# Patient Record
Sex: FEMALE | Race: WHITE | HISPANIC OR LATINO
[De-identification: names, ages, dates, MRNs, and addresses within clinical notes are randomized per-mention and may not be internally consistent; named-entity substitution may affect disease eponyms.]

---

## 2021-10-18 ENCOUNTER — NON-APPOINTMENT (OUTPATIENT)
Age: 31
End: 2021-10-18

## 2021-11-23 ENCOUNTER — NON-APPOINTMENT (OUTPATIENT)
Age: 31
End: 2021-11-23

## 2021-11-30 ENCOUNTER — APPOINTMENT (OUTPATIENT)
Dept: BREAST CENTER | Facility: CLINIC | Age: 31
End: 2021-11-30
Payer: COMMERCIAL

## 2021-11-30 ENCOUNTER — NON-APPOINTMENT (OUTPATIENT)
Age: 31
End: 2021-11-30

## 2021-11-30 VITALS
SYSTOLIC BLOOD PRESSURE: 129 MMHG | BODY MASS INDEX: 39.99 KG/M2 | WEIGHT: 240 LBS | TEMPERATURE: 98.6 F | DIASTOLIC BLOOD PRESSURE: 72 MMHG | HEART RATE: 91 BPM | RESPIRATION RATE: 16 BRPM | HEIGHT: 65 IN | OXYGEN SATURATION: 97 %

## 2021-11-30 DIAGNOSIS — N63.0 UNSPECIFIED LUMP IN UNSPECIFIED BREAST: ICD-10-CM

## 2021-11-30 DIAGNOSIS — Z80.3 FAMILY HISTORY OF MALIGNANT NEOPLASM OF BREAST: ICD-10-CM

## 2021-11-30 DIAGNOSIS — Z82.49 FAMILY HISTORY OF ISCHEMIC HEART DISEASE AND OTHER DISEASES OF THE CIRCULATORY SYSTEM: ICD-10-CM

## 2021-11-30 DIAGNOSIS — Z78.9 OTHER SPECIFIED HEALTH STATUS: ICD-10-CM

## 2021-11-30 DIAGNOSIS — Z86.39 PERSONAL HISTORY OF OTHER ENDOCRINE, NUTRITIONAL AND METABOLIC DISEASE: ICD-10-CM

## 2021-11-30 DIAGNOSIS — Z80.0 FAMILY HISTORY OF MALIGNANT NEOPLASM OF DIGESTIVE ORGANS: ICD-10-CM

## 2021-11-30 DIAGNOSIS — Z91.89 OTHER SPECIFIED PERSONAL RISK FACTORS, NOT ELSEWHERE CLASSIFIED: ICD-10-CM

## 2021-11-30 PROCEDURE — 99203 OFFICE O/P NEW LOW 30 MIN: CPT

## 2021-11-30 RX ORDER — AMOXICILLIN 500 MG/1
500 CAPSULE ORAL
Qty: 21 | Refills: 0 | Status: ACTIVE | COMMUNITY
Start: 2021-10-08

## 2021-11-30 RX ORDER — ATORVASTATIN CALCIUM 10 MG/1
10 TABLET, FILM COATED ORAL
Qty: 90 | Refills: 0 | Status: ACTIVE | COMMUNITY
Start: 2021-08-30

## 2021-11-30 RX ORDER — ACETAMINOPHEN 325 MG
TABLET ORAL
Refills: 0 | Status: ACTIVE | COMMUNITY

## 2021-11-30 RX ORDER — ROSUVASTATIN CALCIUM 5 MG/1
5 TABLET, FILM COATED ORAL
Refills: 0 | Status: ACTIVE | COMMUNITY

## 2021-11-30 RX ORDER — ERYTHROMYCIN 5 MG/G
5 OINTMENT OPHTHALMIC
Qty: 3 | Refills: 0 | Status: ACTIVE | COMMUNITY
Start: 2021-11-24

## 2021-11-30 RX ORDER — ASPIRIN 81 MG
81 TABLET, DELAYED RELEASE (ENTERIC COATED) ORAL
Refills: 0 | Status: ACTIVE | COMMUNITY

## 2021-11-30 RX ORDER — UBIDECARENONE/VIT E ACET 100MG-5
CAPSULE ORAL
Refills: 0 | Status: ACTIVE | COMMUNITY

## 2021-11-30 RX ORDER — ERGOCALCIFEROL 1.25 MG/1
1.25 MG CAPSULE, LIQUID FILLED ORAL
Qty: 4 | Refills: 0 | Status: ACTIVE | COMMUNITY
Start: 2021-08-30

## 2021-11-30 RX ORDER — CHLORHEXIDINE GLUCONATE, 0.12% ORAL RINSE 1.2 MG/ML
0.12 SOLUTION DENTAL
Qty: 473 | Refills: 0 | Status: ACTIVE | COMMUNITY
Start: 2021-10-08

## 2021-11-30 RX ORDER — IBUPROFEN 600 MG/1
600 TABLET, FILM COATED ORAL
Qty: 28 | Refills: 0 | Status: ACTIVE | COMMUNITY
Start: 2021-10-08

## 2021-11-30 RX ORDER — ALBUTEROL SULFATE 90 UG/1
108 (90 BASE) INHALANT RESPIRATORY (INHALATION)
Qty: 18 | Refills: 0 | Status: ACTIVE | COMMUNITY
Start: 2021-10-22

## 2021-12-02 NOTE — DATA REVIEWED
[FreeTextEntry1] : 11/17/20: ([facility]) Dx B/L MG & US- Fibroglandular. R RA 0.5cm nodular asymmetry c/w cyst on US. US- Multiple b/l benign appearing cysts. BI-RADS 2. \par \par 11/19/21 ([facility]) b/l dx mmg and US: heterogenously dense. No mammographic or ultrasonographic evidence of malignancy. Multiple benign appearing bilateral nodules and cysts. Specifically there is no suspicious finding in the 5:00 position of the right breast, 12 cm from the nipple in the area of palpable concern reported by the patient. Further management of palpable nodularity should be based on clinical assessment. BI-RADS 2.

## 2021-12-02 NOTE — HISTORY OF PRESENT ILLNESS
[FreeTextEntry1] : 31 year premenopausal female referred by Dr. Cameron presents for evaluation of left breast mass palpated by the patient in May 2020; she states that the lump was superficial, red, swollen, and resolved within two weeks (denies antibiotic use); she is no longer able to palpate the lump.  Denies nipple discharge, nipple/breast skin changes or dimpling. Denies fever and chills. \par \par  BLAIR lifetime risk is 21% her paternal grandmother had breast cancer dx in her 80's; maternal 1st cousin had colon & stomach cancer\par \par Discussed with the patient the implications for their lifetime risk, which is considered to be elevated for the development of breast cancer over the span of their lifetime. It was explained that it is recommended that they undergo high risk screening surveillance to include biannual radiological screening exams with a mammogram and screening MRI. The patient states that she had an MRI at [facility] last year after developing the left breast lump in 2020, reportedly negative results. \par \par Discussed genetic testing, patient will think about it.

## 2021-12-02 NOTE — PAST MEDICAL HISTORY
[Menstruating] : The patient is menstruating [Menarche Age ____] : age at menarche was [unfilled] [Definite ___ (Date)] : the last menstrual period was [unfilled] [Irregular Cycle Intervals] : are  irregular [Total Preg ___] : G[unfilled] [History of Hormone Replacement Treatment] : has no history of hormone replacement treatment [FreeTextEntry5] : none [FreeTextEntry6] : none [FreeTextEntry7] : yes, only for 6 months, 6 years ago [FreeTextEntry8] : none

## 2022-12-09 ENCOUNTER — APPOINTMENT (OUTPATIENT)
Dept: BREAST CENTER | Facility: CLINIC | Age: 32
End: 2022-12-09

## 2023-06-16 ENCOUNTER — APPOINTMENT (OUTPATIENT)
Dept: SURGERY | Facility: CLINIC | Age: 33
End: 2023-06-16
Payer: COMMERCIAL

## 2023-06-16 VITALS
SYSTOLIC BLOOD PRESSURE: 111 MMHG | HEART RATE: 93 BPM | TEMPERATURE: 97.3 F | HEIGHT: 65 IN | BODY MASS INDEX: 38.7 KG/M2 | DIASTOLIC BLOOD PRESSURE: 76 MMHG | OXYGEN SATURATION: 98 % | WEIGHT: 232.25 LBS

## 2023-06-16 DIAGNOSIS — I10 ESSENTIAL (PRIMARY) HYPERTENSION: ICD-10-CM

## 2023-06-16 DIAGNOSIS — J45.909 UNSPECIFIED ASTHMA, UNCOMPLICATED: ICD-10-CM

## 2023-06-16 DIAGNOSIS — K76.0 FATTY (CHANGE OF) LIVER, NOT ELSEWHERE CLASSIFIED: ICD-10-CM

## 2023-06-16 DIAGNOSIS — K80.20 CALCULUS OF GALLBLADDER W/OUT CHOLECYSTITIS W/OUT OBSTRUCTION: ICD-10-CM

## 2023-06-16 PROCEDURE — 99214 OFFICE O/P EST MOD 30 MIN: CPT

## 2023-06-22 PROBLEM — J45.909 ASTHMA: Status: ACTIVE | Noted: 2023-06-16

## 2023-06-22 PROBLEM — K80.20 CHOLELITHIASIS: Status: ACTIVE | Noted: 2023-06-22

## 2023-06-22 PROBLEM — I10 HIGH BLOOD PRESSURE: Status: ACTIVE | Noted: 2023-06-16

## 2023-06-22 PROBLEM — K76.0 STEATOSIS OF LIVER: Status: ACTIVE | Noted: 2021-11-30

## 2023-06-22 NOTE — PHYSICAL EXAM
[Oriented to Person] : oriented to person [Oriented to Place] : oriented to place [Oriented to Time] : oriented to time [Calm] : calm [Abdominal Masses] : No abdominal masses [Abdomen Tenderness] : ~T ~M No abdominal tenderness [Tender] : was nontender [Enlarged] : not enlarged [de-identified] : NAD, comfortable [de-identified] : Normocephalic, atraumatic. No scleral icterus.  [de-identified] : Supple, no JVD or cervical lymphadenopathy.  [de-identified] : No respiratory distress  [de-identified] : +BS soft, nontender, nondistended. No hepatosplenomegaly. \par

## 2023-06-22 NOTE — DATA REVIEWED
[FreeTextEntry1] : Date of Exam: 02-\par  \par EXAM:  ULTRASOUND ABDOMEN COMPLETE\par \par HISTORY:  Female, 33 years-old with a fatty liver\par \par TECHNIQUE:  Using real-time ultrasonography with a high-resolution broadband phased-array curved transducer, multiplanar gray scale images were obtained and supplemented with color Doppler. Static images are provided for review.\par \par COMPARISON:  12/10/2021\par \par FINDINGS:  \par Abdominal Aorta/IVC: No evidence of abdominal aortic aneurysm. Visualized portions of the IVC were patent.\par \par Liver:  Enlarged and increased in echogenicity measuring 20.9 cm in length consistent with diffuse hepatocellular disease, likely hepatic steatosis. The visualized portion of portal and hepatic veins are unremarkable. No intrahepatic biliary duct dilatation.\par \par Gallbladder: There are mobile gallstones layering the dependent portion of the gallbladder. No wall thickening, and no sonographic Childers's Sign.\par \par Common Bile Duct: Normal measuring 0.4 cm diameter at the vikas hepatis.\par \par Pancreas: The visualized portion of the body of the pancreas is within normal limits. The tail is obscured by overlying bowel gas. No pancreatic duct dilatation.\par \par Kidneys: Normal in size, morphology and cortical echotexture. There is no suspicious renal lesion.\par No hydronephrosis, shadowing calculi or perinephric fluid.\par Right Kidney: 10.2 cm in length.\par Left Kidney:   11.2 cm in length.\par \par Spleen: Normal size, contour and echotexture measuring 8.9 cm in length.\par \par IMPRESSION:\par 1. Enlarged echogenic liver consistent with hepatic steatosis.\par 2. Gallstones. No secondary signs of cholecystitis.

## 2023-06-22 NOTE — ASSESSMENT
[FreeTextEntry1] : Case discussed/pt seen with attending, . 34 y/o female with cholelithiasis. She is relatively asymptomatic with her main discomfort being in the LUQ which is atypical symptom of gallstone etiology. Discussed option of surgery however wishes to hold off as she is asymptomatic.  Discussed if symptoms worsening or she develops any new symptoms to contact office. Pt has recently started Wegovy for weight loss. We discussed f/u in 6 months, sooner if needed. All questions answered.

## 2023-06-22 NOTE — HISTORY OF PRESENT ILLNESS
[de-identified] : This is a y/o female/male presenting to the office for evaluation and management of a cholelithiasis. She reports she had an ultrasound to monitor her dx of fatty liver  x 4 months ago and was incidentally diagnosed with gallstones. She reports she occasionally develops discomfort in the left upper quadrant of the abdomen. She reports the discomfort radiates to the right lower quadrant. She reports this discomfort sometimes comes on after eating a fatty meal. She denies any other triggers. She reports occasional nausea and diarrhea not associated with the discomfort. She reports she has avoided fatty foods since being told her diagnosis. She reports she recently started Wegovy to aid with weight loss. She denies any fever,chills.

## 2023-12-08 ENCOUNTER — APPOINTMENT (OUTPATIENT)
Dept: SURGERY | Facility: CLINIC | Age: 33
End: 2023-12-08